# Patient Record
Sex: MALE | URBAN - METROPOLITAN AREA
[De-identification: names, ages, dates, MRNs, and addresses within clinical notes are randomized per-mention and may not be internally consistent; named-entity substitution may affect disease eponyms.]

---

## 2022-09-22 ENCOUNTER — OFFICE VISIT (OUTPATIENT)
Dept: INTERNAL MEDICINE CLINIC | Age: 52
End: 2022-09-22
Payer: COMMERCIAL

## 2022-09-22 VITALS
SYSTOLIC BLOOD PRESSURE: 100 MMHG | OXYGEN SATURATION: 98 % | HEIGHT: 60 IN | HEART RATE: 72 BPM | BODY MASS INDEX: 28.47 KG/M2 | DIASTOLIC BLOOD PRESSURE: 58 MMHG | WEIGHT: 145 LBS

## 2022-09-22 DIAGNOSIS — Z00.00 PREVENTATIVE HEALTH CARE: Primary | ICD-10-CM

## 2022-09-22 DIAGNOSIS — E78.1 HYPERTRIGLYCERIDEMIA: ICD-10-CM

## 2022-09-22 DIAGNOSIS — Z12.5 SPECIAL SCREENING, PROSTATE CANCER: ICD-10-CM

## 2022-09-22 DIAGNOSIS — R73.03 PREDIABETES: ICD-10-CM

## 2022-09-22 DIAGNOSIS — F17.200 SMOKING: ICD-10-CM

## 2022-09-22 DIAGNOSIS — Z13.220 SCREENING FOR CHOLESTEROL LEVEL: ICD-10-CM

## 2022-09-22 DIAGNOSIS — Z12.11 COLON CANCER SCREENING: ICD-10-CM

## 2022-09-22 DIAGNOSIS — D64.9 ANEMIA, UNSPECIFIED TYPE: ICD-10-CM

## 2022-09-22 DIAGNOSIS — Z00.00 PREVENTATIVE HEALTH CARE: ICD-10-CM

## 2022-09-22 DIAGNOSIS — I10 PRIMARY HYPERTENSION: ICD-10-CM

## 2022-09-22 PROBLEM — K21.9 GASTROESOPHAGEAL REFLUX DISEASE: Status: ACTIVE | Noted: 2021-10-26

## 2022-09-22 PROBLEM — F41.9 ANXIETY: Status: ACTIVE | Noted: 2021-10-26

## 2022-09-22 PROBLEM — F32.A DEPRESSIVE DISORDER: Status: ACTIVE | Noted: 2021-10-26

## 2022-09-22 LAB
A/G RATIO: 2.3 (ref 1.1–2.2)
ALBUMIN SERPL-MCNC: 4.8 G/DL (ref 3.4–5)
ALP BLD-CCNC: 52 U/L (ref 40–129)
ALT SERPL-CCNC: 16 U/L (ref 10–40)
ANION GAP SERPL CALCULATED.3IONS-SCNC: 12 MMOL/L (ref 3–16)
AST SERPL-CCNC: 14 U/L (ref 15–37)
BASOPHILS ABSOLUTE: 0 K/UL (ref 0–0.2)
BASOPHILS RELATIVE PERCENT: 0.4 %
BILIRUB SERPL-MCNC: 0.3 MG/DL (ref 0–1)
BUN BLDV-MCNC: 11 MG/DL (ref 7–20)
CALCIUM SERPL-MCNC: 10 MG/DL (ref 8.3–10.6)
CHLORIDE BLD-SCNC: 99 MMOL/L (ref 99–110)
CHOLESTEROL, TOTAL: 210 MG/DL (ref 0–199)
CO2: 27 MMOL/L (ref 21–32)
CREAT SERPL-MCNC: 1.1 MG/DL (ref 0.9–1.3)
EOSINOPHILS ABSOLUTE: 0.1 K/UL (ref 0–0.6)
EOSINOPHILS RELATIVE PERCENT: 2 %
GFR AFRICAN AMERICAN: >60
GFR NON-AFRICAN AMERICAN: >60
GLUCOSE BLD-MCNC: 100 MG/DL (ref 70–99)
HCT VFR BLD CALC: 40.9 % (ref 40.5–52.5)
HDLC SERPL-MCNC: 58 MG/DL (ref 40–60)
HEMOGLOBIN: 12.7 G/DL (ref 13.5–17.5)
LDL CHOLESTEROL CALCULATED: 101 MG/DL
LYMPHOCYTES ABSOLUTE: 2 K/UL (ref 1–5.1)
LYMPHOCYTES RELATIVE PERCENT: 39.2 %
MCH RBC QN AUTO: 23.1 PG (ref 26–34)
MCHC RBC AUTO-ENTMCNC: 31 G/DL (ref 31–36)
MCV RBC AUTO: 74.3 FL (ref 80–100)
MONOCYTES ABSOLUTE: 0.4 K/UL (ref 0–1.3)
MONOCYTES RELATIVE PERCENT: 7.9 %
NEUTROPHILS ABSOLUTE: 2.6 K/UL (ref 1.7–7.7)
NEUTROPHILS RELATIVE PERCENT: 50.5 %
PDW BLD-RTO: 15.2 % (ref 12.4–15.4)
PLATELET # BLD: 203 K/UL (ref 135–450)
PMV BLD AUTO: 7.9 FL (ref 5–10.5)
POTASSIUM SERPL-SCNC: 4.1 MMOL/L (ref 3.5–5.1)
PROSTATE SPECIFIC ANTIGEN: 2.78 NG/ML (ref 0–4)
RBC # BLD: 5.51 M/UL (ref 4.2–5.9)
SODIUM BLD-SCNC: 138 MMOL/L (ref 136–145)
TOTAL PROTEIN: 6.9 G/DL (ref 6.4–8.2)
TRIGL SERPL-MCNC: 254 MG/DL (ref 0–150)
TSH SERPL DL<=0.05 MIU/L-ACNC: 0.98 UIU/ML (ref 0.27–4.2)
VLDLC SERPL CALC-MCNC: 51 MG/DL
WBC # BLD: 5.1 K/UL (ref 4–11)

## 2022-09-22 PROCEDURE — G8427 DOCREV CUR MEDS BY ELIG CLIN: HCPCS | Performed by: INTERNAL MEDICINE

## 2022-09-22 PROCEDURE — 3017F COLORECTAL CA SCREEN DOC REV: CPT | Performed by: INTERNAL MEDICINE

## 2022-09-22 PROCEDURE — 99396 PREV VISIT EST AGE 40-64: CPT | Performed by: INTERNAL MEDICINE

## 2022-09-22 PROCEDURE — G8419 CALC BMI OUT NRM PARAM NOF/U: HCPCS | Performed by: INTERNAL MEDICINE

## 2022-09-22 PROCEDURE — 4004F PT TOBACCO SCREEN RCVD TLK: CPT | Performed by: INTERNAL MEDICINE

## 2022-09-22 PROCEDURE — 99213 OFFICE O/P EST LOW 20 MIN: CPT | Performed by: INTERNAL MEDICINE

## 2022-09-22 RX ORDER — CHLORAL HYDRATE 500 MG
3000 CAPSULE ORAL 3 TIMES DAILY
COMMUNITY

## 2022-09-22 RX ORDER — CHOLECALCIFEROL (VITAMIN D3) 25 MCG
TABLET ORAL
COMMUNITY
Start: 2022-09-13

## 2022-09-22 RX ORDER — HYDROXYZINE HYDROCHLORIDE 25 MG/1
25 TABLET, FILM COATED ORAL 2 TIMES DAILY
COMMUNITY

## 2022-09-22 RX ORDER — NICOTINE 21 MG/24HR
1 PATCH, TRANSDERMAL 24 HOURS TRANSDERMAL EVERY 24 HOURS
Qty: 30 PATCH | Refills: 3 | Status: SHIPPED | OUTPATIENT
Start: 2022-09-22 | End: 2023-09-22

## 2022-09-22 SDOH — HEALTH STABILITY: MENTAL HEALTH: DECREASE IN PERCEIVED RISK: 0

## 2022-09-22 SDOH — HEALTH STABILITY: MENTAL HEALTH: MEAL TIME: 0

## 2022-09-22 SDOH — HEALTH STABILITY: MENTAL HEALTH: DRIVING: 0

## 2022-09-22 SDOH — HEALTH STABILITY: MENTAL HEALTH: COMPANY OF SMOKERS: 0

## 2022-09-22 SDOH — HEALTH STABILITY: MENTAL HEALTH: PERCEIVED MEDIA MESSAGE ABOUT SMOKING: 0

## 2022-09-22 SDOH — HEALTH STABILITY: MENTAL HEALTH: DRINKING COFFEE: 0

## 2022-09-22 SDOH — HEALTH STABILITY: MENTAL HEALTH: CONTACT WITH SUBSTANCE: 0

## 2022-09-22 ASSESSMENT — ENCOUNTER SYMPTOMS: SORE THROAT: 0

## 2022-09-22 ASSESSMENT — LIFESTYLE VARIABLES: CRAVINGS: 1

## 2022-09-22 NOTE — PROGRESS NOTES
Yi Quiros (:  1970) is a 50 y.o. male,New patient, here for evaluation of the following chief complaint(s):  New Patient and Toe Pain (Callus, pins and needles Left foot under toe and in the middle of the foot.)         ASSESSMENT/PLAN:  1. Preventative health care  -     TSH; Future  -     CBC with Auto Differential; Future  -     Comprehensive Metabolic Panel; Future  2. Primary hypertension  3. Colon cancer screening  -     Fecal DNA Colorectal cancer screening (Cologuard)  4. Special screening, prostate cancer  -     PSA, Prostatic Specific Antigen; Future  5. Screening for cholesterol level  -     Lipid Panel; Future  6. Smoking  -     nicotine (NICODERM CQ) 21 MG/24HR; Place 1 patch onto the skin every 24 hours, Disp-30 patch, R-3Normal  7. Hypertriglyceridemia  -     Lipid Panel; Future  8. Prediabetes  -     Hemoglobin A1C; Future  Patient is doing fairly well again check his baseline studies as described above I discussed with him preventative care and he is agreeable to go ahead with it    I did  patient smoking cessation is willing to try nicotine patch and that was ordered to his pharmacy      Return in about 3 months (around 2022) for Chr.dis. Mx.         Subjective   SUBJECTIVE/OBJECTIVE:    Lab Review   No results found for: NA, K, CO2, BUN, CREATININE, GLUCOSE, CALCIUM  No results found for: WBC, HGB, HCT, MCV, PLT  No results found for: CHOL, TRIG, HDL, LDLDIRECT    Vitals 9238   SYSTOLIC 455   DIASTOLIC 58   Pulse 72   SpO2 98   Weight 145 lb   Height 4' 11\"   Body mass index 29.28 kg/m2       Patient here for a annual checkup and to establish himself    Nicotine Dependence  Presents for initial visit. Symptoms include cravings. Symptoms are negative for decreased concentration, fatigue, headache, insomnia, irritability and sore throat. Preferred tobacco types include cigarettes. Preferred cigarette types include filtered. Preferred strength is regular.  Risk factors do not include company of smokers, contact with substance, decrease in perceived risk, drinking coffee, driving, meal time, perceived media message about smoking or stress. Review of Systems   Constitutional:  Negative for fatigue and irritability. HENT:  Negative for sore throat. Psychiatric/Behavioral:  Negative for decreased concentration. The patient does not have insomnia. Objective   Physical Exam       This dictation was generated by voice recognition computer software. Although all attempts are made to edit the dictation for accuracy, there may be errors in the transcription that are not intended. An electronic signature was used to authenticate this note.     --Laury Woods MD

## 2022-09-23 LAB
ESTIMATED AVERAGE GLUCOSE: 134.1 MG/DL
HBA1C MFR BLD: 6.3 %

## 2022-09-23 NOTE — RESULT ENCOUNTER NOTE
Please let the patient know that results show that the he fits is seeing has microcytic anemia I think patient will need to have further evaluation for his anemia orders have been placed in the system depending on the findings the patient may need to have further investigations

## 2022-10-05 ENCOUNTER — TELEPHONE (OUTPATIENT)
Dept: INTERNAL MEDICINE CLINIC | Age: 52
End: 2022-10-05

## 2022-10-10 DIAGNOSIS — D64.9 ANEMIA, UNSPECIFIED TYPE: ICD-10-CM

## 2022-10-10 LAB
BASOPHILS ABSOLUTE: 0 K/UL (ref 0–0.2)
BASOPHILS RELATIVE PERCENT: 0.4 %
EOSINOPHILS ABSOLUTE: 0.2 K/UL (ref 0–0.6)
EOSINOPHILS RELATIVE PERCENT: 4.2 %
FERRITIN: 126.2 NG/ML (ref 30–400)
FOLATE: 9.03 NG/ML (ref 4.78–24.2)
HCT VFR BLD CALC: 38.4 % (ref 40.5–52.5)
HEMOGLOBIN: 11.9 G/DL (ref 13.5–17.5)
IMMATURE RETIC FRACT: 0.57 (ref 0.21–0.37)
IRON SATURATION: 32 % (ref 20–50)
IRON: 90 UG/DL (ref 59–158)
LYMPHOCYTES ABSOLUTE: 1.1 K/UL (ref 1–5.1)
LYMPHOCYTES RELATIVE PERCENT: 31 %
MCH RBC QN AUTO: 22.9 PG (ref 26–34)
MCHC RBC AUTO-ENTMCNC: 31 G/DL (ref 31–36)
MCV RBC AUTO: 74.1 FL (ref 80–100)
MONOCYTES ABSOLUTE: 0.3 K/UL (ref 0–1.3)
MONOCYTES RELATIVE PERCENT: 7.5 %
NEUTROPHILS ABSOLUTE: 2.1 K/UL (ref 1.7–7.7)
NEUTROPHILS RELATIVE PERCENT: 56.9 %
PDW BLD-RTO: 15.3 % (ref 12.4–15.4)
PLATELET # BLD: 236 K/UL (ref 135–450)
PMV BLD AUTO: 8.1 FL (ref 5–10.5)
RBC # BLD: 5.18 M/UL (ref 4.2–5.9)
RETICULOCYTE ABSOLUTE COUNT: 0.05 M/UL
RETICULOCYTE COUNT PCT: 0.92 % (ref 0.5–2.18)
TOTAL IRON BINDING CAPACITY: 284 UG/DL (ref 260–445)
VITAMIN B-12: 513 PG/ML (ref 211–911)
WBC # BLD: 3.7 K/UL (ref 4–11)

## 2022-10-13 DIAGNOSIS — D64.9 NORMOCYTIC ANEMIA: Primary | ICD-10-CM

## 2022-10-13 DIAGNOSIS — D64.9 NORMOCYTIC ANEMIA: ICD-10-CM

## 2022-10-18 LAB
HEMOGLOBIN ELECTROPHORESIS: NORMAL
HGB ELECTROPHORESIS INTERP: NORMAL